# Patient Record
Sex: MALE | Race: BLACK OR AFRICAN AMERICAN
[De-identification: names, ages, dates, MRNs, and addresses within clinical notes are randomized per-mention and may not be internally consistent; named-entity substitution may affect disease eponyms.]

---

## 2019-07-12 ENCOUNTER — HOSPITAL ENCOUNTER (EMERGENCY)
Dept: HOSPITAL 74 - JER | Age: 34
Discharge: HOME | End: 2019-07-12
Payer: SELF-PAY

## 2019-07-12 VITALS — SYSTOLIC BLOOD PRESSURE: 136 MMHG | TEMPERATURE: 97.6 F | HEART RATE: 88 BPM | DIASTOLIC BLOOD PRESSURE: 74 MMHG

## 2019-07-12 VITALS — BODY MASS INDEX: 30.7 KG/M2
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
## 2019-07-12 DIAGNOSIS — S69.91XA: Primary | ICD-10-CM
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
## 2019-07-12 PROCEDURE — 2W3GX1Z IMMOBILIZATION OF RIGHT THUMB USING SPLINT: ICD-10-PCS

## 2019-07-12 NOTE — PDOC
History of Present Illness





- General


Chief Complaint: Injury


Stated Complaint: PAIN,RT THUMB


Time Seen by Provider: 07/12/19 02:26


History Source: Patient


Exam Limitations: No Limitations





- History of Present Illness


Initial Comments: 





07/12/19 03:48


34yo M with no significant PMH presenting to ED with complaints of R thumb pain 

s/p fall. Pt states he tripped and broke his fall by placing his R arm back 

with hand on the ground but states the weight was mainly on his R thumb. He did 

not hear any popping or cracking sound. He has had significant pain and 

swelling in the R thumb with decreased ability to move the thumb. He denies 

numbness/tingling, pain with wrist motion, inability to move other digits. 





Past History





- Past Medical History


Allergies/Adverse Reactions: 


 Allergies











Allergy/AdvReac Type Severity Reaction Status Date / Time


 


No Known Allergies Allergy   Verified 07/12/19 03:00











Home Medications: 


Ambulatory Orders





Ibuprofen [Motrin -] 600 mg PO TID #21 tablet 07/12/19 








COPD: No





- Immunization History


Immunization Up to Date: Yes





- Suicide/Smoking/Psychosocial Hx


Smoking History: Never smoked


Have you smoked in the past 12 months: No


Information on smoking cessation initiated: No


Hx Alcohol Use: No


Drug/Substance Use Hx: No





**Review of Systems





- Review of Systems


Constitutional: No: Symptoms Reported


HEENTM: No: Symptoms Reported


Respiratory: No: Symptoms reported


Cardiac (ROS): No: Symptoms Reported


ABD/GI: No: Symptoms Reported


: No: Symptoms Reported


Musculoskeletal: Yes: See HPI


Integumentary: No: Symptoms Reported


Neurological: No: Symptoms reported





*Physical Exam





- Vital Signs


 Last Vital Signs











Temp Pulse Resp BP Pulse Ox


 


 97.6 F   88   20   136/74   99 


 


 07/12/19 02:10  07/12/19 02:10  07/12/19 02:10  07/12/19 02:10  07/12/19 02:10














- Physical Exam


General Appearance: Yes: Nourished, Appropriately Dressed.  No: Apparent 

Distress


HEENT: positive: EOMI, ROSE


Neck: positive: Trachea midline, Supple


Respiratory/Chest: positive: Lungs Clear, Normal Breath Sounds


Comments:: 





07/12/19 04:39


radial pulses 2+


Musculoskeletal: positive: Decreased Range of Motion (of R thumb).  negative: 

CVA Tenderness


Extremity: positive: Normal Capillary Refill.  negative: Coldness, Cyanosis


Integumentary: positive: Normal Color, Dry, Warm, Swelling (R thenar compartment

, tense).  negative: Erythema, Jaundice, Ecchymosis, Bruising


Neurologic: positive: CNs II-XII NML intact, Fully Oriented, Alert, Normal Mood/

Affect, Normal Response, Motor Strength 5/5.  negative: Sensory Deficit





Procedures





- Splinting


Splint Location: Right: Finger


Hand-Made Type: fiberglass


Splint Type: Yes: Thumb Spica


Post-Proc Neuro Vasc Exam: normal


Ace Bandage: yes, 3"


Complications: No


Post splint xray: No





ED Treatment Course





- RADIOLOGY


Radiology Studies Ordered: 














 Category Date Time Status


 


 WRIST W/HAND-RIGHT* [RAD] Stat Radiology  07/12/19 02:57 Taken














- Medications


Given in the ED: 


ED Medications














Discontinued Medications














Generic Name Dose Route Start Last Admin





  Trade Name Freq  PRN Reason Stop Dose Admin


 


Ibuprofen  600 mg  07/12/19 02:57  07/12/19 03:02





  Motrin -  PO  07/12/19 02:58  600 mg





  ONCE ONE   Administration





     





     





     





     














Medical Decision Making





- Medical Decision Making





07/12/19 04:36


34yo M with no significant PMH presenting to ED with complaints of R thumb pain 

s/p fall. Pt states he tripped and broke his fall by placing his R arm back 

with hand on the ground but states the weight was mainly on his R thumb. He did 

not hear any popping or cracking sound. He has had significant pain and 

swelling in the R thumb with decreased ability to move the thumb. He denies 

numbness/tingling, pain with wrist motion, inability to move other digits. 


   Vitals: wnl


   PE: R thenar compartment swelling, limited active and passive ROM of R thumb

, full rom of other digits, wrist. No lacerations or other signs of trauma, 

pulses palpable, sensation intact. No snuffbox tenderness. 





ddx includes but not limited to fracture, dislocation, compartment syndrome, 

sprain. 





-xray


-motrin





xray does not show obvious fracture. soft tissue swelling. 


Pt reports pain relief with motrin. 





Will place in thumb spica and give ortho referral. 


See procedure note. 





Pt feeling better, given referral and rx for motrin to pharmacy. safe for dc 

home. given return precautions. 





*DC/Admit/Observation/Transfer


Diagnosis at time of Disposition: 


Thumb injury


Qualifiers:


 Encounter type: initial encounter Laterality: right Qualified Code(s): 

S69.91XA - Unspecified injury of right wrist, hand and finger(s), initial 

encounter








- Discharge Dispostion


Disposition: HOME


Condition at time of disposition: Good


Decision to Admit order: No





- Prescriptions


Prescriptions: 


Ibuprofen [Motrin -] 600 mg PO TID #21 tablet





- Referrals


Referrals: 


David Blankenship DO [Staff Physician] - 


James Carr DO [Staff Physician] - 





- Patient Instructions


Additional Instructions: 


You were seen in the emergency room today after falling and injuring your 

thumb. The xray does not show any obvious fractures but I still recommend 

following up with an orthopedic doctor. Information is listed below. 





You have been placed in a splint. Please keep this on until you are seen by an 

orthopedist. Try to schedule an appointment no later than Monday. Let them know 

that you were seen in the emergency room. 





You can apply ice for the swelling and you can take Tylenol and/or Motrin for 

the pain as needed. 





Come back to the emergency room if you have numbness in your fingers, are 

unable to move your fingers, swelling gets worse, you notice changes in color 

of your fingers or if any new concerning symptom develops. 





Thank you





- Post Discharge Activity

## 2019-07-12 NOTE — PDOC
Attending Attestation





- Resident


Resident Name: KarenBekah





- ED Attending Attestation


I have performed the following: I have examined & evaluated the patient, The 

case was reviewed & discussed with the resident, I agree w/resident's findings 

& plan, Exceptions are as noted





- HPI


HPI: 





07/12/19 04:10


33M denies pmh here with R thumb px after FOOSH.  Pt broke his fall with out 

stretched RUE





- Physicial Exam


PE: 





07/12/19 07:18


Agree with exam as documented by resident





- Medical Decision Making





07/12/19 04:11


Eval for msk injury, fracture/dislocation





XR equivocal





thumb spica splint





f/u ortho